# Patient Record
Sex: FEMALE | Race: BLACK OR AFRICAN AMERICAN | NOT HISPANIC OR LATINO | Employment: OTHER | URBAN - METROPOLITAN AREA
[De-identification: names, ages, dates, MRNs, and addresses within clinical notes are randomized per-mention and may not be internally consistent; named-entity substitution may affect disease eponyms.]

---

## 2023-11-28 ENCOUNTER — TELEPHONE (OUTPATIENT)
Age: 63
End: 2023-11-28

## 2023-11-28 NOTE — TELEPHONE ENCOUNTER
Called Wallace Escalona requested that patient call the office with updated insurance. If patient is self pay it is $165 time of visit - 50 % discount.

## 2023-11-30 ENCOUNTER — OFFICE VISIT (OUTPATIENT)
Age: 63
End: 2023-11-30
Payer: MEDICARE

## 2023-11-30 VITALS
SYSTOLIC BLOOD PRESSURE: 138 MMHG | BODY MASS INDEX: 36.65 KG/M2 | RESPIRATION RATE: 18 BRPM | DIASTOLIC BLOOD PRESSURE: 84 MMHG | WEIGHT: 220 LBS | HEIGHT: 65 IN

## 2023-11-30 DIAGNOSIS — Z11.59 ENCOUNTER FOR HEPATITIS C SCREENING TEST FOR LOW RISK PATIENT: Primary | ICD-10-CM

## 2023-11-30 DIAGNOSIS — K29.70 GASTRITIS WITHOUT BLEEDING, UNSPECIFIED CHRONICITY, UNSPECIFIED GASTRITIS TYPE: ICD-10-CM

## 2023-11-30 DIAGNOSIS — Z86.010 HISTORY OF COLON POLYPS: ICD-10-CM

## 2023-11-30 PROCEDURE — 99204 OFFICE O/P NEW MOD 45 MIN: CPT | Performed by: INTERNAL MEDICINE

## 2023-11-30 RX ORDER — GABAPENTIN 100 MG/1
CAPSULE ORAL
COMMUNITY
Start: 2023-11-24

## 2023-11-30 RX ORDER — CYCLOBENZAPRINE HCL 10 MG
10 TABLET ORAL
COMMUNITY
Start: 2023-09-15

## 2023-11-30 RX ORDER — ATORVASTATIN CALCIUM 80 MG/1
TABLET, FILM COATED ORAL
COMMUNITY
Start: 2023-02-28 | End: 2024-02-28

## 2023-11-30 RX ORDER — NAPROXEN 500 MG/1
500 TABLET ORAL
COMMUNITY
Start: 2023-04-10 | End: 2024-04-09

## 2023-11-30 RX ORDER — CLOTRIMAZOLE AND BETAMETHASONE DIPROPIONATE 10; .64 MG/G; MG/G
CREAM TOPICAL 2 TIMES DAILY
COMMUNITY
Start: 2023-07-18 | End: 2023-12-24

## 2023-11-30 RX ORDER — PANTOPRAZOLE SODIUM 40 MG/1
TABLET, DELAYED RELEASE ORAL
COMMUNITY
Start: 2023-02-28 | End: 2024-02-28

## 2023-11-30 RX ORDER — BETAMETHASONE DIPROPIONATE 0.5 MG/G
CREAM TOPICAL 2 TIMES DAILY
COMMUNITY
Start: 2023-09-15

## 2023-11-30 NOTE — PROGRESS NOTES
[facility] - Outpatient Note  Elkin Henriquez 70 y.o. female MRN: [id number]  Encounter: [id number]      ASSESSMENT AND PLAN:    Elkin Henriquez is a 70 y.o. old pleasant female with PMH of colon polyps, GERD, hyperlipidemia who presents for consultation gerd and history of colon polyps    History of colon polyps-last colonoscopy in 2018 showed 7 colon polyps up to 13 mm with repeat recommended in 3 years. I do not have the pathology at the time of this dictation. Repeat was recommended in 3 years. Plan for repeat colonoscopy    Hepatitis C screening-no previous antibody  Check hepatitis C    GERD, abnormal CAT scan-patient with long history of GERD on Protonix as needed. She says sometimes she has epigastric discomfort and had CAT scan done recently at outside hospital that shows diffuse thickening of the stomach which could be gastritis versus underdistention  For EGD to evaluate for Simms's/GERD and gastritis with biopsies for H. pylori. I advised patient to take Protonix every day for its full benefit on empty stomach  GERD lifestyle changes discussed, including avoidance of trigger foods (potential foods include coffee, caffeine, chocolate, mint, tomato-based products, spicy foods, fatty foods), avoid tight fitting clothing, elevated head of bed 30 degrees, avoid eating 2-3 hours prior to bedtime, weight loss, avoid alcohol, avoid tobacco use. Obesity-36.61. Patient refusing weight management referral  Counseled her on diet and nutrition and exercise      There are no diagnoses linked to this encounter.  ______________________________________________________________________    SUBJECTIVE: Patient reports she has intermittent heartburn and dyspepsia which she describes epigastric discomfort for the past several years. She takes Protonix as needed as she wants to minimize medications she takes so she does not take it every day. She has no nausea vomiting.   She denies any dysphagia weight loss odynophagia. She had colonoscopy in 2018 as above. She does struggle with her weight despite stating she does not eat very much. I reviewed prior external notes    I reviewed previous lab results and images      REVIEW OF SYSTEMS:     REVIEW OF ALL OTHER SYSTEMS IS OTHERWISE NEGATIVE. Historical Information   No past medical history on file. No past surgical history on file. Social History   Social History     Substance and Sexual Activity   Alcohol Use Not on file     Social History     Substance and Sexual Activity   Drug Use Not on file     Social History     Tobacco Use   Smoking Status Not on file   Smokeless Tobacco Not on file     No family history on file. Meds/Allergies       Current Outpatient Medications:     atorvastatin (LIPITOR) 80 mg tablet    betamethasone, augmented, (DIPROLENE-AF) 0.05 % cream    clotrimazole-betamethasone (LOTRISONE) 1-0.05 % cream    cyclobenzaprine (FLEXERIL) 10 mg tablet    gabapentin (NEURONTIN) 100 mg capsule    naproxen (NAPROSYN) 500 mg tablet    pantoprazole (PROTONIX) 40 mg tablet    Allergies   Allergen Reactions    Morphine Hives and Rash           Objective     Blood pressure 138/84, resp. rate 18, height 5' 5" (1.651 m), weight 99.8 kg (220 lb). Body mass index is 36.61 kg/m². PHYSICAL EXAM:      General Appearance:   Alert, cooperative, no distress   HEENT:   Normocephalic, atraumatic, anicteric. Neck:  Supple, symmetrical, trachea midline   Lungs:   Clear to auscultation bilaterally; no rales, rhonchi or wheezing; respirations unlabored    Heart[de-identified]   Regular rate and rhythm; no murmur, rub, or gallop.    Abdomen:   Soft, non-tender, non-distended; normal bowel sounds; no masses, no organomegaly    Genitalia:   Deferred    Rectal:   Deferred    Extremities:  No cyanosis, clubbing or edema    Pulses:  2+ and symmetric    Skin:  No jaundice, rashes, or lesions    Lymph nodes:  No palpable cervical lymphadenopathy Lab Results:   No visits with results within 1 Day(s) from this visit. Latest known visit with results is:   No results found for any previous visit. Radiology Results:   No results found.

## 2023-11-30 NOTE — H&P (VIEW-ONLY)
[facility] - Outpatient Note  Reina Cano 63 y.o. female MRN: [id number]  Encounter: [id number]      ASSESSMENT AND PLAN:    Reina Cano is a 63 y.o. old pleasant female with PMH of colon polyps, GERD, hyperlipidemia who presents for consultation gerd and history of colon polyps    History of colon polyps-last colonoscopy in 2018 showed 7 colon polyps up to 13 mm with repeat recommended in 3 years.  I do not have the pathology at the time of this dictation.  Repeat was recommended in 3 years.  Plan for repeat colonoscopy    Hepatitis C screening-no previous antibody  Check hepatitis C    GERD, abnormal CAT scan-patient with long history of GERD on Protonix as needed.  She says sometimes she has epigastric discomfort and had CAT scan done recently at outside hospital that shows diffuse thickening of the stomach which could be gastritis versus underdistention  For EGD to evaluate for Simms's/GERD and gastritis with biopsies for H. pylori.  I advised patient to take Protonix every day for its full benefit on empty stomach  GERD lifestyle changes discussed, including avoidance of trigger foods (potential foods include coffee, caffeine, chocolate, mint, tomato-based products, spicy foods, fatty foods), avoid tight fitting clothing, elevated head of bed 30 degrees, avoid eating 2-3 hours prior to bedtime, weight loss, avoid alcohol, avoid tobacco use.    Obesity-36.61.  Patient refusing weight management referral  Counseled her on diet and nutrition and exercise      There are no diagnoses linked to this encounter.  ______________________________________________________________________    SUBJECTIVE: Patient reports she has intermittent heartburn and dyspepsia which she describes epigastric discomfort for the past several years.  She takes Protonix as needed as she wants to minimize medications she takes so she does not take it every day.  She has no nausea vomiting.  She denies  "any dysphagia weight loss odynophagia.  She had colonoscopy in 2018 as above.  She does struggle with her weight despite stating she does not eat very much.      I reviewed prior external notes    I reviewed previous lab results and images      REVIEW OF SYSTEMS:     REVIEW OF ALL OTHER SYSTEMS IS OTHERWISE NEGATIVE.      Historical Information   No past medical history on file.  No past surgical history on file.  Social History   Social History     Substance and Sexual Activity   Alcohol Use Not on file     Social History     Substance and Sexual Activity   Drug Use Not on file     Social History     Tobacco Use   Smoking Status Not on file   Smokeless Tobacco Not on file     No family history on file.    Meds/Allergies       Current Outpatient Medications:     atorvastatin (LIPITOR) 80 mg tablet    betamethasone, augmented, (DIPROLENE-AF) 0.05 % cream    clotrimazole-betamethasone (LOTRISONE) 1-0.05 % cream    cyclobenzaprine (FLEXERIL) 10 mg tablet    gabapentin (NEURONTIN) 100 mg capsule    naproxen (NAPROSYN) 500 mg tablet    pantoprazole (PROTONIX) 40 mg tablet    Allergies   Allergen Reactions    Morphine Hives and Rash           Objective     Blood pressure 138/84, resp. rate 18, height 5' 5\" (1.651 m), weight 99.8 kg (220 lb). Body mass index is 36.61 kg/m².      PHYSICAL EXAM:      General Appearance:   Alert, cooperative, no distress   HEENT:   Normocephalic, atraumatic, anicteric.     Neck:  Supple, symmetrical, trachea midline   Lungs:   Clear to auscultation bilaterally; no rales, rhonchi or wheezing; respirations unlabored    Heart::   Regular rate and rhythm; no murmur, rub, or gallop.   Abdomen:   Soft, non-tender, non-distended; normal bowel sounds; no masses, no organomegaly    Genitalia:   Deferred    Rectal:   Deferred    Extremities:  No cyanosis, clubbing or edema    Pulses:  2+ and symmetric    Skin:  No jaundice, rashes, or lesions    Lymph nodes:  No palpable cervical lymphadenopathy    "     Lab Results:   No visits with results within 1 Day(s) from this visit.   Latest known visit with results is:   No results found for any previous visit.         Radiology Results:   No results found.

## 2023-11-30 NOTE — PATIENT INSTRUCTIONS
Scheduled date of EGD/colonoscopy (as of today): 12/19/23  Physician performing EGD/colonoscopy: Wood[facility]  Location of EGD/colonoscopy: [facility]  Desired bowel prep reviewed with patient: Miralax  Instructions reviewed with patient by: Ilya DUARTE  Clearances:

## 2023-12-19 ENCOUNTER — ANESTHESIA (OUTPATIENT)
Dept: GASTROENTEROLOGY | Facility: HOSPITAL | Age: 63
End: 2023-12-19

## 2023-12-19 ENCOUNTER — ANESTHESIA EVENT (OUTPATIENT)
Dept: GASTROENTEROLOGY | Facility: HOSPITAL | Age: 63
End: 2023-12-19

## 2023-12-19 ENCOUNTER — HOSPITAL ENCOUNTER (OUTPATIENT)
Dept: GASTROENTEROLOGY | Facility: HOSPITAL | Age: 63
Setting detail: OUTPATIENT SURGERY
Discharge: HOME | End: 2023-12-19
Attending: INTERNAL MEDICINE
Payer: MEDICARE

## 2023-12-19 VITALS
HEIGHT: 65 IN | RESPIRATION RATE: 18 BRPM | HEART RATE: 65 BPM | BODY MASS INDEX: 36.07 KG/M2 | SYSTOLIC BLOOD PRESSURE: 122 MMHG | OXYGEN SATURATION: 98 % | DIASTOLIC BLOOD PRESSURE: 88 MMHG | WEIGHT: 216.49 LBS | TEMPERATURE: 97.9 F

## 2023-12-19 DIAGNOSIS — K29.70 GASTRITIS WITHOUT BLEEDING, UNSPECIFIED CHRONICITY, UNSPECIFIED GASTRITIS TYPE: ICD-10-CM

## 2023-12-19 DIAGNOSIS — Z86.010 HISTORY OF COLON POLYPS: ICD-10-CM

## 2023-12-19 PROCEDURE — 88341 IMHCHEM/IMCYTCHM EA ADD ANTB: CPT | Performed by: PATHOLOGY

## 2023-12-19 PROCEDURE — 88342 IMHCHEM/IMCYTCHM 1ST ANTB: CPT | Performed by: PATHOLOGY

## 2023-12-19 PROCEDURE — 45385 COLONOSCOPY W/LESION REMOVAL: CPT | Performed by: INTERNAL MEDICINE

## 2023-12-19 PROCEDURE — 43239 EGD BIOPSY SINGLE/MULTIPLE: CPT | Performed by: INTERNAL MEDICINE

## 2023-12-19 PROCEDURE — 88305 TISSUE EXAM BY PATHOLOGIST: CPT | Performed by: PATHOLOGY

## 2023-12-19 RX ORDER — PROPOFOL 10 MG/ML
INJECTION, EMULSION INTRAVENOUS AS NEEDED
Status: DISCONTINUED | OUTPATIENT
Start: 2023-12-19 | End: 2023-12-19

## 2023-12-19 RX ORDER — SODIUM CHLORIDE, SODIUM LACTATE, POTASSIUM CHLORIDE, CALCIUM CHLORIDE 600; 310; 30; 20 MG/100ML; MG/100ML; MG/100ML; MG/100ML
INJECTION, SOLUTION INTRAVENOUS CONTINUOUS PRN
Status: DISCONTINUED | OUTPATIENT
Start: 2023-12-19 | End: 2023-12-19

## 2023-12-19 RX ORDER — GLYCOPYRROLATE 0.2 MG/ML
INJECTION INTRAMUSCULAR; INTRAVENOUS AS NEEDED
Status: DISCONTINUED | OUTPATIENT
Start: 2023-12-19 | End: 2023-12-19

## 2023-12-19 RX ORDER — LIDOCAINE HYDROCHLORIDE 20 MG/ML
INJECTION, SOLUTION EPIDURAL; INFILTRATION; INTRACAUDAL; PERINEURAL AS NEEDED
Status: DISCONTINUED | OUTPATIENT
Start: 2023-12-19 | End: 2023-12-19

## 2023-12-19 RX ADMIN — PROPOFOL 20 MG: 10 INJECTION, EMULSION INTRAVENOUS at 08:57

## 2023-12-19 RX ADMIN — SODIUM CHLORIDE, SODIUM LACTATE, POTASSIUM CHLORIDE, AND CALCIUM CHLORIDE: .6; .31; .03; .02 INJECTION, SOLUTION INTRAVENOUS at 08:16

## 2023-12-19 RX ADMIN — PROPOFOL 60 MG: 10 INJECTION, EMULSION INTRAVENOUS at 08:52

## 2023-12-19 RX ADMIN — PROPOFOL 50 MG: 10 INJECTION, EMULSION INTRAVENOUS at 08:49

## 2023-12-19 RX ADMIN — LIDOCAINE HYDROCHLORIDE 100 MG: 20 INJECTION, SOLUTION EPIDURAL; INFILTRATION; INTRACAUDAL at 08:45

## 2023-12-19 RX ADMIN — PROPOFOL 20 MG: 10 INJECTION, EMULSION INTRAVENOUS at 08:55

## 2023-12-19 RX ADMIN — PROPOFOL 150 MG: 10 INJECTION, EMULSION INTRAVENOUS at 08:45

## 2023-12-19 RX ADMIN — GLYCOPYRROLATE 0.1 MCG: 0.2 INJECTION, SOLUTION INTRAMUSCULAR; INTRAVENOUS at 08:39

## 2023-12-19 NOTE — DISCHARGE INSTRUCTIONS
Upper Endoscopy and Colonoscopy   WHAT YOU NEED TO KNOW:   An upper endoscopy is also called an upper gastrointestinal (GI) endoscopy, or an esophagogastroduodenoscopy (EGD). It is a procedure to examine the inside of your esophagus, stomach, and duodenum (first part of the small intestine) with a scope. You may feel bloated, gassy, or have some abdominal discomfort after your procedure. Your throat may be sore for 24 to 36 hours. You may burp or pass gas from air that is still inside your body.                A colonoscopy is a procedure to examine the inside of your colon (intestine) with a scope. Polyps or tissue growths may have been removed during your colonoscopy. It is normal to feel bloated and to have some abdominal discomfort. You should be passing gas. If you have hemorrhoids or you had polyps removed, you may have a small amount of bleeding.          DISCHARGE INSTRUCTIONS:   Seek care immediately if:   You have sudden, severe abdominal pain.     You have problems swallowing.     You have a large amount of black, sticky bowel movements or blood in your bowel movements.     You have sudden trouble breathing.     You feel weak, lightheaded, or faint or your heart beats faster than normal for you.     Contact your healthcare provider if:   You have a fever and chills.      You have nausea or are vomiting.      Your abdomen is bloated or feels full and hard.     You have abdominal pain.    You have a large amount of black, sticky bowel movements or blood in your bowel movements.    You have not had a bowel movement for 3 days after your procedure.    You have rash or hives.    You have questions or concerns about your procedure.     Activity:   ·       Do not lift, strain, or run for 24 hours after your procedure.     ·       Rest after your procedure. You have been given medicine to relax you. Do not drive or make important decisions until the day after your procedure. Return to your normal activity as  directed.     ·       Relieve gas and discomfort from bloating by lying on your right side with a heating pad on your abdomen. You may need to take short walks to help the gas move out. Eat small meals until bloating is relieved.  Follow up with your healthcare provider as directed: Write down your questions so you remember to ask them during your visits.      If you take a “blood thinner”, please review the specific instructions from your endoscopist about when you should resume it. These can be found in the “Recommendation” and “Your Medication list” sections of this After Visit Summary.

## 2023-12-19 NOTE — ANESTHESIA POSTPROCEDURE EVALUATION
Post-Op Assessment Note    CV Status:  Stable  Pain Score: 0    Pain management: adequate       Mental Status:  Alert and awake   Hydration Status:  Euvolemic   PONV Controlled:  Controlled   Airway Patency:  Patent     Post Op Vitals Reviewed: Yes      Staff: CRNA               /70 (12/19/23 0904)    Temp 97.9 °F (36.6 °C) (12/19/23 0904)    Pulse 76 (12/19/23 0904)   Resp 18 (12/19/23 0904)    SpO2 97 % (12/19/23 0904)

## 2023-12-19 NOTE — ANESTHESIA PREPROCEDURE EVALUATION
Procedure:  COLONOSCOPY  EGD    Relevant Problems   No relevant active problems   Obesity     NPO appropriate    Physical Exam    Airway    Mallampati score: III  TM Distance: >3 FB  Neck ROM: full     Dental        Cardiovascular  Rhythm: regular    Pulmonary   Breath sounds clear to auscultation    Other Findings  post-pubertal.      Anesthesia Plan  ASA Score- 2     Anesthesia Type- IV sedation with anesthesia with ASA Monitors.         Additional Monitors:     Airway Plan:            Plan Factors-    Chart reviewed.    Patient summary reviewed.    Patient is not a current smoker.              Induction- intravenous.    Postoperative Plan-     Informed Consent- Anesthetic plan and risks discussed with patient.  I personally reviewed this patient with the CRNA. Discussed and agreed on the Anesthesia Plan with the CRNA..

## 2023-12-19 NOTE — INTERVAL H&P NOTE
H&P reviewed. After examining the patient I find no changes in the patients condition since the H&P had been written.    There were no vitals filed for this visit.

## 2023-12-26 ENCOUNTER — TELEPHONE (OUTPATIENT)
Age: 63
End: 2023-12-26

## 2023-12-26 DIAGNOSIS — A04.8 H. PYLORI INFECTION: Primary | ICD-10-CM

## 2023-12-26 PROCEDURE — 88305 TISSUE EXAM BY PATHOLOGIST: CPT | Performed by: PATHOLOGY

## 2023-12-26 PROCEDURE — 88341 IMHCHEM/IMCYTCHM EA ADD ANTB: CPT | Performed by: PATHOLOGY

## 2023-12-26 PROCEDURE — 88342 IMHCHEM/IMCYTCHM 1ST ANTB: CPT | Performed by: PATHOLOGY

## 2023-12-26 RX ORDER — TETRACYCLINE HYDROCHLORIDE 500 MG/1
500 CAPSULE ORAL 4 TIMES DAILY
Qty: 56 CAPSULE | Refills: 0 | Status: SHIPPED | OUTPATIENT
Start: 2023-12-26 | End: 2024-01-09

## 2023-12-26 RX ORDER — OMEPRAZOLE 40 MG/1
40 CAPSULE, DELAYED RELEASE ORAL 2 TIMES DAILY
Qty: 28 CAPSULE | Refills: 0 | Status: SHIPPED | OUTPATIENT
Start: 2023-12-26 | End: 2024-01-09

## 2023-12-26 RX ORDER — BISMUTH SUBSALICYLATE 262 MG/1
524 TABLET, CHEWABLE ORAL
Qty: 30 TABLET | Refills: 0 | Status: SHIPPED | OUTPATIENT
Start: 2023-12-26

## 2023-12-26 RX ORDER — METRONIDAZOLE 500 MG/1
500 TABLET ORAL 4 TIMES DAILY
Qty: 56 TABLET | Refills: 0 | Status: SHIPPED | OUTPATIENT
Start: 2023-12-26 | End: 2024-01-09

## 2023-12-26 NOTE — TELEPHONE ENCOUNTER
Caller: Patient transferred to [facility]     Doctor: n/a    Reason for call: n/a    Call back#: n/a

## 2023-12-26 NOTE — TELEPHONE ENCOUNTER
----- Message from Anthony Troy MD sent at 12/26/2023 10:23 AM EST -----  Hi can we call patient H. pylori and treat accordingly.  She will also need repeat colonoscopy in 3 to 6 months for history of colon polyps given poor preparation.  Will need to do a bowel prep.  Thank you.

## 2023-12-26 NOTE — TELEPHONE ENCOUNTER
Tried pt no answer and can't leave message, I will try again later this afternoon sent medication to treat H pylori to pharmacy.

## 2024-02-01 ENCOUNTER — TELEPHONE (OUTPATIENT)
Age: 64
End: 2024-02-01

## 2024-02-01 NOTE — TELEPHONE ENCOUNTER
02/01/24  Screened by: Soraya Mandujano    Referring Provider     Pre- Screening:     There is no height or weight on file to calculate BMI.  Has patient been referred for a routine screening Colonoscopy? yes  Is the patient between 45-75 years old? yes      Previous Colonoscopy yes   If yes:    Date: 12/19/2023    Facility:     Reason:         Does the patient want to see a Gastroenterologist prior to their procedure OR are they having any GI symptoms? no    Has the patient been hospitalized or had abdominal surgery in the past 6 months? no    Does the patient use supplemental oxygen? no    Does the patient take Coumadin, Lovenox, Plavix, Elliquis, Xarelto, or other blood thinning medication? no    Has the patient had a stroke, cardiac event, or stent placed in the past year? no      If patient is between 45yrs - 49yrs, please advise patient that we will have to confirm benefits & coverage with their insurance company for a routine screening colonoscopy.

## 2024-02-01 NOTE — TELEPHONE ENCOUNTER
Scheduled date of colonoscopy (as of today):06/21/2024  Physician performing colonoscopy:Dr Troy  Location of colonoscopy:[facility]  Bowel prep reviewed with patient: miralax/dul  Instructions reviewed with patient by:sent via email  Clearances: n/a

## 2024-06-21 ENCOUNTER — ANESTHESIA (OUTPATIENT)
Dept: GASTROENTEROLOGY | Facility: HOSPITAL | Age: 64
End: 2024-06-21

## 2024-06-21 ENCOUNTER — ANESTHESIA EVENT (OUTPATIENT)
Dept: GASTROENTEROLOGY | Facility: HOSPITAL | Age: 64
End: 2024-06-21

## 2024-06-21 ENCOUNTER — HOSPITAL ENCOUNTER (OUTPATIENT)
Dept: GASTROENTEROLOGY | Facility: HOSPITAL | Age: 64
Setting detail: OUTPATIENT SURGERY
End: 2024-06-21
Attending: INTERNAL MEDICINE
Payer: MEDICARE

## 2024-06-21 VITALS
WEIGHT: 216.93 LBS | RESPIRATION RATE: 20 BRPM | HEIGHT: 65 IN | OXYGEN SATURATION: 100 % | TEMPERATURE: 97.5 F | BODY MASS INDEX: 36.14 KG/M2 | DIASTOLIC BLOOD PRESSURE: 77 MMHG | SYSTOLIC BLOOD PRESSURE: 132 MMHG | HEART RATE: 73 BPM

## 2024-06-21 DIAGNOSIS — Z86.010 HISTORY OF COLON POLYPS: ICD-10-CM

## 2024-06-21 PROBLEM — E78.5 HYPERLIPIDEMIA: Status: ACTIVE | Noted: 2024-06-21

## 2024-06-21 PROBLEM — K21.9 GASTROESOPHAGEAL REFLUX DISEASE: Status: ACTIVE | Noted: 2024-06-21

## 2024-06-21 PROCEDURE — 88305 TISSUE EXAM BY PATHOLOGIST: CPT | Performed by: PATHOLOGY

## 2024-06-21 PROCEDURE — 45385 COLONOSCOPY W/LESION REMOVAL: CPT | Performed by: INTERNAL MEDICINE

## 2024-06-21 RX ORDER — PROPOFOL 10 MG/ML
INJECTION, EMULSION INTRAVENOUS AS NEEDED
Status: DISCONTINUED | OUTPATIENT
Start: 2024-06-21 | End: 2024-06-21

## 2024-06-21 RX ORDER — SODIUM CHLORIDE, SODIUM LACTATE, POTASSIUM CHLORIDE, CALCIUM CHLORIDE 600; 310; 30; 20 MG/100ML; MG/100ML; MG/100ML; MG/100ML
125 INJECTION, SOLUTION INTRAVENOUS CONTINUOUS
Status: DISCONTINUED | OUTPATIENT
Start: 2024-06-21 | End: 2024-06-25 | Stop reason: HOSPADM

## 2024-06-21 RX ORDER — SODIUM CHLORIDE, SODIUM LACTATE, POTASSIUM CHLORIDE, CALCIUM CHLORIDE 600; 310; 30; 20 MG/100ML; MG/100ML; MG/100ML; MG/100ML
INJECTION, SOLUTION INTRAVENOUS CONTINUOUS PRN
Status: DISCONTINUED | OUTPATIENT
Start: 2024-06-21 | End: 2024-06-21

## 2024-06-21 RX ORDER — IBUPROFEN 400 MG/1
TABLET ORAL EVERY 6 HOURS PRN
COMMUNITY

## 2024-06-21 RX ORDER — LIDOCAINE HYDROCHLORIDE 20 MG/ML
INJECTION, SOLUTION EPIDURAL; INFILTRATION; INTRACAUDAL; PERINEURAL AS NEEDED
Status: DISCONTINUED | OUTPATIENT
Start: 2024-06-21 | End: 2024-06-21

## 2024-06-21 RX ADMIN — SODIUM CHLORIDE, SODIUM LACTATE, POTASSIUM CHLORIDE, AND CALCIUM CHLORIDE: .6; .31; .03; .02 INJECTION, SOLUTION INTRAVENOUS at 10:03

## 2024-06-21 RX ADMIN — PROPOFOL 120 MG: 10 INJECTION, EMULSION INTRAVENOUS at 10:39

## 2024-06-21 RX ADMIN — PROPOFOL 30 MG: 10 INJECTION, EMULSION INTRAVENOUS at 10:42

## 2024-06-21 RX ADMIN — SODIUM CHLORIDE, SODIUM LACTATE, POTASSIUM CHLORIDE, AND CALCIUM CHLORIDE 125 ML/HR: .6; .31; .03; .02 INJECTION, SOLUTION INTRAVENOUS at 09:19

## 2024-06-21 RX ADMIN — LIDOCAINE HYDROCHLORIDE 100 MG: 20 INJECTION, SOLUTION EPIDURAL; INFILTRATION; INTRACAUDAL; PERINEURAL at 10:39

## 2024-06-21 RX ADMIN — PROPOFOL 50 MG: 10 INJECTION, EMULSION INTRAVENOUS at 10:45

## 2024-06-21 RX ADMIN — PROPOFOL 50 MG: 10 INJECTION, EMULSION INTRAVENOUS at 10:48

## 2024-06-21 NOTE — ANESTHESIA POSTPROCEDURE EVALUATION
Post-Op Assessment Note    CV Status:  Stable    Pain management: adequate       Mental Status:  Alert and awake   Hydration Status:  Euvolemic   PONV Controlled:  Controlled   Airway Patency:  Patent     Post Op Vitals Reviewed: Yes    No anethesia notable event occurred.    Staff: CRNA               /71 (06/21/24 1058)    Temp 97.5 °F (36.4 °C) (06/21/24 1058)    Pulse 74 (06/21/24 1058)   Resp 20 (06/21/24 1058)    SpO2 96 % (06/21/24 1058)

## 2024-06-21 NOTE — H&P
"History and Physical - [facility]  Reina Cano 64 y.o. female MRN: [id number]                  HPI: Reina Cano is a 64 y.o. year old female who presents for colonoscopy for colon cancer screening      REVIEW OF SYSTEMS: Per the HPI, and otherwise unremarkable.    Historical Information   Past Medical History:   Diagnosis Date    Hyperlipidemia      Past Surgical History:   Procedure Laterality Date     SECTION      COLONOSCOPY       Social History   Social History     Substance and Sexual Activity   Alcohol Use Not Currently    Comment: hasnt had a drink for years     Social History     Substance and Sexual Activity   Drug Use Never     Social History     Tobacco Use   Smoking Status Every Day    Current packs/day: 0.25    Types: Cigarettes   Smokeless Tobacco Never     History reviewed. No pertinent family history.    Meds/Allergies       Current Outpatient Medications:     atorvastatin (LIPITOR) 80 mg tablet    betamethasone, augmented, (DIPROLENE-AF) 0.05 % cream    clotrimazole-betamethasone (LOTRISONE) 1-0.05 % cream    ibuprofen (MOTRIN) 400 mg tablet    omeprazole (PriLOSEC) 40 MG capsule    bismuth subsalicylate (PEPTO BISMOL) 262 MG chewable tablet    cyclobenzaprine (FLEXERIL) 10 mg tablet    gabapentin (NEURONTIN) 100 mg capsule    naproxen (NAPROSYN) 500 mg tablet    pantoprazole (PROTONIX) 40 mg tablet    Current Facility-Administered Medications:     lactated ringers infusion, 125 mL/hr, Intravenous, Continuous, 125 mL/hr at 24 0919    Allergies   Allergen Reactions    Morphine Hives and Rash       Objective     /79   Pulse 71   Temp 97.5 °F (36.4 °C) (Temporal)   Resp 18   Ht 5' 5\" (1.651 m)   Wt 98.4 kg (216 lb 14.9 oz)   SpO2 98%   BMI 36.10 kg/m²       PHYSICAL EXAM    Gen: NAD  Head: NCAT  CV: RRR  CHEST: Clear  ABD: soft, NT/ND  EXT: no edema      ASSESSMENT/PLAN:  Reina Cano is a 64 y.o. year old female who presents for " colonoscopy for colon cancer screening The patient is stable and optimized for the procedure, we reviewed risk and benefits. Risk include but not limited to infection, bleeding, perforation and missing a lesion.

## 2024-06-21 NOTE — ANESTHESIA PREPROCEDURE EVALUATION
Procedure:  COLONOSCOPY    Relevant Problems   ANESTHESIA (within normal limits)      CARDIO   (+) Hyperlipidemia   (-) MI (myocardial infarction) (HCC)      GI/HEPATIC  NPO confirmed  BMI 36.1   (+) Gastroesophageal reflux disease      /RENAL (within normal limits)      HEMATOLOGY (within normal limits)      NEURO/PSYCH   (-) CVA (cerebral vascular accident) (HCC)      PULMONARY (within normal limits)   (-) URI (upper respiratory infection)     Allergies   Allergen Reactions    Morphine Hives and Rash     Social History     Tobacco Use    Smoking status: Every Day     Current packs/day: 0.25     Types: Cigarettes    Smokeless tobacco: Never   Vaping Use    Vaping status: Never Used   Substance Use Topics    Alcohol use: Not Currently     Comment: hasnt had a drink for years    Drug use: Never     Current Outpatient Medications   Medication Instructions    atorvastatin (LIPITOR) 80 mg tablet TAKE 1 TABLET BY MOUTIN THE MORNING    betamethasone, augmented, (DIPROLENE-AF) 0.05 % cream Apply externally, 2 times daily    bismuth subsalicylate (PEPTO BISMOL) 524 mg, Oral, 4 times daily (before meals and at bedtime)    clotrimazole-betamethasone (LOTRISONE) 1-0.05 % cream Apply externally, 2 times daily    cyclobenzaprine (FLEXERIL) 10 mg    gabapentin (NEURONTIN) 100 mg capsule     ibuprofen (MOTRIN) 400 mg tablet Oral, Every 6 hours PRN    naproxen (NAPROSYN) 500 mg    omeprazole (PRILOSEC) 40 mg, Oral, 2 times daily, 1/2 hr. Ac meals in AM and PM    pantoprazole (PROTONIX) 40 mg tablet TAKE 1 TABLET BY MOUTH DAILY FOR STOMACH PAIN     Lab Results   Component Value Date    SODIUM 140 03/15/2024    K 4.4 03/15/2024     03/15/2024    CO2 25 03/15/2024    BUN 13 03/15/2024    CREATININE 0.7 03/15/2024    GLUC 101 03/15/2024    HGBA1C 5.8 (H) 03/15/2024    AST 17 03/10/2023    ALT 22 03/10/2023    ALKPHOS 80 03/10/2023    TBILI 0.3 03/10/2023    ALB 4.2 03/10/2023     Vitals:    06/21/24 0912   BP: 120/79   Pulse:  71   Resp: 18   Temp: 97.5 °F (36.4 °C)   SpO2: 98%     Physical Exam    Airway    Mallampati score: III  TM Distance: >3 FB  Neck ROM: full     Dental   Comment: Poor dentition, denies loose teeth     Cardiovascular  Rhythm: regular    Pulmonary   Breath sounds clear to auscultation    Other Findings  post-pubertal.      Anesthesia Plan  ASA Score- 2     Anesthesia Type- IV sedation with anesthesia with ASA Monitors.         Additional Monitors:     Airway Plan:     Comment: O2 mask, natural airway, EtCO2 monitor. Risks discussed including awareness, aspiration, drug reactions and conversion to GA..       Plan Factors-    Chart reviewed.   Existing labs reviewed. Patient summary reviewed.    Patient is not a current smoker.              Induction- intravenous.    Postoperative Plan-         Informed Consent- Anesthetic plan and risks discussed with patient.  I personally reviewed this patient with the CRNA. Discussed and agreed on the Anesthesia Plan with the CRNA..

## 2024-06-25 PROCEDURE — 88305 TISSUE EXAM BY PATHOLOGIST: CPT | Performed by: PATHOLOGY

## 2024-06-26 ENCOUNTER — TELEPHONE (OUTPATIENT)
Age: 64
End: 2024-06-26

## 2024-06-26 NOTE — TELEPHONE ENCOUNTER
----- Message from Anthony Troy MD sent at 6/25/2024  4:40 PM EDT -----  Hi can we let pt know about one precancerous polyp. Repeat colon in 5 years for polyp.

## 2024-07-22 ENCOUNTER — HOSPITAL ENCOUNTER (EMERGENCY)
Facility: HOSPITAL | Age: 64
Discharge: HOME | End: 2024-07-22
Attending: EMERGENCY MEDICINE
Payer: MEDICARE

## 2024-07-22 ENCOUNTER — APPOINTMENT (EMERGENCY)
Dept: CT IMAGING | Facility: HOSPITAL | Age: 64
End: 2024-07-22
Payer: MEDICARE

## 2024-07-22 VITALS
RESPIRATION RATE: 20 BRPM | DIASTOLIC BLOOD PRESSURE: 104 MMHG | TEMPERATURE: 97.8 F | SYSTOLIC BLOOD PRESSURE: 152 MMHG | OXYGEN SATURATION: 99 % | HEART RATE: 68 BPM

## 2024-07-22 DIAGNOSIS — R10.9 LEFT FLANK PAIN: Primary | ICD-10-CM

## 2024-07-22 DIAGNOSIS — T18.3XXA FOREIGN BODY IN SMALL INTESTINE, INITIAL ENCOUNTER: ICD-10-CM

## 2024-07-22 LAB
ALBUMIN SERPL BCG-MCNC: 3.8 G/DL (ref 3.5–5)
ALP SERPL-CCNC: 71 U/L (ref 34–104)
ALT SERPL W P-5'-P-CCNC: 16 U/L (ref 7–52)
ANION GAP SERPL CALCULATED.3IONS-SCNC: 3 MMOL/L (ref 4–13)
AST SERPL W P-5'-P-CCNC: 15 U/L (ref 13–39)
BASOPHILS # BLD MANUAL: 0.1 THOUSAND/UL (ref 0–0.1)
BASOPHILS NFR MAR MANUAL: 1 % (ref 0–1)
BILIRUB SERPL-MCNC: 0.41 MG/DL (ref 0.2–1)
BILIRUB UR QL STRIP: NEGATIVE
BUN SERPL-MCNC: 7 MG/DL (ref 5–25)
CALCIUM SERPL-MCNC: 9.1 MG/DL (ref 8.4–10.2)
CHLORIDE SERPL-SCNC: 107 MMOL/L (ref 96–108)
CLARITY UR: CLEAR
CO2 SERPL-SCNC: 30 MMOL/L (ref 21–32)
COLOR UR: NORMAL
CREAT SERPL-MCNC: 0.59 MG/DL (ref 0.6–1.3)
EOSINOPHIL # BLD MANUAL: 0.1 THOUSAND/UL (ref 0–0.4)
EOSINOPHIL NFR BLD MANUAL: 1 % (ref 0–6)
ERYTHROCYTE [DISTWIDTH] IN BLOOD BY AUTOMATED COUNT: 14 % (ref 11.6–15.1)
GFR SERPL CREATININE-BSD FRML MDRD: 95 ML/MIN/1.73SQ M
GIANT PLATELETS BLD QL SMEAR: PRESENT
GLUCOSE SERPL-MCNC: 93 MG/DL (ref 65–140)
GLUCOSE UR STRIP-MCNC: NEGATIVE MG/DL
HCT VFR BLD AUTO: 44 % (ref 34.8–46.1)
HGB BLD-MCNC: 14 G/DL (ref 11.5–15.4)
HGB UR QL STRIP.AUTO: NEGATIVE
KETONES UR STRIP-MCNC: NEGATIVE MG/DL
LEUKOCYTE ESTERASE UR QL STRIP: NEGATIVE
LG PLATELETS BLD QL SMEAR: PRESENT
LIPASE SERPL-CCNC: <6 U/L (ref 11–82)
LYMPHOCYTES # BLD AUTO: 1.96 THOUSAND/UL (ref 0.6–4.47)
LYMPHOCYTES # BLD AUTO: 20 % (ref 14–44)
MCH RBC QN AUTO: 27.6 PG (ref 26.8–34.3)
MCHC RBC AUTO-ENTMCNC: 31.8 G/DL (ref 31.4–37.4)
MCV RBC AUTO: 87 FL (ref 82–98)
MONOCYTES # BLD AUTO: 0.39 THOUSAND/UL (ref 0–1.22)
MONOCYTES NFR BLD: 4 % (ref 4–12)
NEUTROPHILS # BLD MANUAL: 7.24 THOUSAND/UL (ref 1.85–7.62)
NEUTS SEG NFR BLD AUTO: 74 % (ref 43–75)
NITRITE UR QL STRIP: NEGATIVE
PH UR STRIP.AUTO: 6.5 [PH]
PLATELET # BLD AUTO: 266 THOUSANDS/UL (ref 149–390)
PLATELET BLD QL SMEAR: ADEQUATE
PMV BLD AUTO: 10.9 FL (ref 8.9–12.7)
POTASSIUM SERPL-SCNC: 4.5 MMOL/L (ref 3.5–5.3)
PROT SERPL-MCNC: 6.7 G/DL (ref 6.4–8.4)
PROT UR STRIP-MCNC: NEGATIVE MG/DL
RBC # BLD AUTO: 5.08 MILLION/UL (ref 3.81–5.12)
SODIUM SERPL-SCNC: 140 MMOL/L (ref 135–147)
SP GR UR STRIP.AUTO: 1.02 (ref 1–1.03)
UROBILINOGEN UR STRIP-ACNC: <2 MG/DL
WBC # BLD AUTO: 9.79 THOUSAND/UL (ref 4.31–10.16)

## 2024-07-22 PROCEDURE — 99284 EMERGENCY DEPT VISIT MOD MDM: CPT

## 2024-07-22 PROCEDURE — 36415 COLL VENOUS BLD VENIPUNCTURE: CPT | Performed by: EMERGENCY MEDICINE

## 2024-07-22 PROCEDURE — 74176 CT ABD & PELVIS W/O CONTRAST: CPT

## 2024-07-22 PROCEDURE — 85027 COMPLETE CBC AUTOMATED: CPT | Performed by: EMERGENCY MEDICINE

## 2024-07-22 PROCEDURE — 96361 HYDRATE IV INFUSION ADD-ON: CPT

## 2024-07-22 PROCEDURE — 96374 THER/PROPH/DIAG INJ IV PUSH: CPT

## 2024-07-22 PROCEDURE — 99284 EMERGENCY DEPT VISIT MOD MDM: CPT | Performed by: EMERGENCY MEDICINE

## 2024-07-22 PROCEDURE — 80053 COMPREHEN METABOLIC PANEL: CPT | Performed by: EMERGENCY MEDICINE

## 2024-07-22 PROCEDURE — 83690 ASSAY OF LIPASE: CPT | Performed by: EMERGENCY MEDICINE

## 2024-07-22 PROCEDURE — 81003 URINALYSIS AUTO W/O SCOPE: CPT | Performed by: EMERGENCY MEDICINE

## 2024-07-22 PROCEDURE — 85007 BL SMEAR W/DIFF WBC COUNT: CPT | Performed by: EMERGENCY MEDICINE

## 2024-07-22 RX ORDER — ACETAMINOPHEN 325 MG/1
975 TABLET ORAL ONCE
Status: COMPLETED | OUTPATIENT
Start: 2024-07-22 | End: 2024-07-22

## 2024-07-22 RX ORDER — KETOROLAC TROMETHAMINE 30 MG/ML
15 INJECTION, SOLUTION INTRAMUSCULAR; INTRAVENOUS ONCE
Status: COMPLETED | OUTPATIENT
Start: 2024-07-22 | End: 2024-07-22

## 2024-07-22 RX ADMIN — KETOROLAC TROMETHAMINE 15 MG: 30 INJECTION, SOLUTION INTRAMUSCULAR; INTRAVENOUS at 12:41

## 2024-07-22 RX ADMIN — SODIUM CHLORIDE 1000 ML: 0.9 INJECTION, SOLUTION INTRAVENOUS at 12:42

## 2024-07-22 RX ADMIN — ACETAMINOPHEN 975 MG: 325 TABLET, FILM COATED ORAL at 12:26

## 2024-07-22 NOTE — ED PROVIDER NOTES
History  Chief Complaint   Patient presents with    Flank Pain     Left sided flank pain for the past 3 weeks starting after they received a colonoscopy. Denies issues with bowel movements, but reports urinary frequency, denies pain with urination.      64-year-old female no significant reported past history presenting with left flank pain.  Reports insidious onset of pain shortly after colonoscopy a few weeks ago.  Reports pain is intermittent and states is occasionally sharp.  Denies any radiation.  Denies any urinary changes such as pain, frequency, bleeding.  Denies any nausea vomiting diarrhea.  Denies history of similar.  Denies chest pain shortness of breath.  Denies any other complaints.  Chart reviewed.    Past Medical History:  No date: Hyperlipidemia  Family History: non-contributory  Social History          Prior to Admission Medications   Prescriptions Last Dose Informant Patient Reported? Taking?   atorvastatin (LIPITOR) 80 mg tablet  Self Yes No   Sig: TAKE 1 TABLET BY MOUTIN THE MORNING   betamethasone, augmented, (DIPROLENE-AF) 0.05 % cream  Self Yes No   Sig: Apply topically 2 (two) times a day   bismuth subsalicylate (PEPTO BISMOL) 262 MG chewable tablet   No No   Sig: Chew 2 tablets (524 mg total) 4 (four) times a day (before meals and at bedtime)   Patient not taking: Reported on 6/10/2024   clotrimazole-betamethasone (LOTRISONE) 1-0.05 % cream  Self Yes No   Sig: Apply topically 2 (two) times a day   cyclobenzaprine (FLEXERIL) 10 mg tablet  Self Yes No   Sig: Take 10 mg by mouth   Patient not taking: Reported on 6/10/2024   gabapentin (NEURONTIN) 100 mg capsule  Self Yes No   Patient not taking: Reported on 6/10/2024   ibuprofen (MOTRIN) 400 mg tablet   Yes No   Sig: Take by mouth every 6 (six) hours as needed for mild pain   naproxen (NAPROSYN) 500 mg tablet  Self Yes No   Sig: Take 500 mg by mouth   Patient not taking: Reported on 6/10/2024   omeprazole (PriLOSEC) 40 MG capsule   No No   Sig:  Take 1 capsule (40 mg total) by mouth 2 (two) times a day for 14 days 1/2 hr. Ac meals in AM and PM   pantoprazole (PROTONIX) 40 mg tablet  Self Yes No   Sig: TAKE 1 TABLET BY MOUTH DAILY FOR STOMACH PAIN   Patient not taking: Reported on 6/10/2024      Facility-Administered Medications: None       Past Medical History:   Diagnosis Date    Hyperlipidemia        Past Surgical History:   Procedure Laterality Date     SECTION      COLONOSCOPY         History reviewed. No pertinent family history.  I have reviewed and agree with the history as documented.    E-Cigarette/Vaping    E-Cigarette Use Never User      E-Cigarette/Vaping Substances     Social History     Tobacco Use    Smoking status: Every Day     Current packs/day: 0.25     Types: Cigarettes    Smokeless tobacco: Never   Vaping Use    Vaping status: Never Used   Substance Use Topics    Alcohol use: Not Currently     Comment: hasnt had a drink for years    Drug use: Never       Review of Systems   Constitutional:  Negative for appetite change, chills, diaphoresis, fever and unexpected weight change.   HENT:  Negative for congestion and rhinorrhea.    Eyes:  Negative for photophobia and visual disturbance.   Respiratory:  Negative for cough, chest tightness and shortness of breath.    Cardiovascular:  Negative for chest pain, palpitations and leg swelling.   Gastrointestinal:  Negative for abdominal distention, abdominal pain, blood in stool, constipation, diarrhea, nausea and vomiting.   Genitourinary:  Positive for flank pain. Negative for dysuria and hematuria.   Musculoskeletal:  Negative for back pain, joint swelling, neck pain and neck stiffness.   Skin:  Negative for color change, pallor, rash and wound.   Neurological:  Negative for dizziness, syncope, weakness, light-headedness and headaches.   Psychiatric/Behavioral:  Negative for agitation.    All other systems reviewed and are negative.      Physical Exam  Physical Exam  Vitals and nursing  note reviewed.   Constitutional:       General: She is not in acute distress.     Appearance: Normal appearance. She is well-developed. She is not ill-appearing, toxic-appearing or diaphoretic.   HENT:      Head: Normocephalic and atraumatic.      Nose: Nose normal. No congestion or rhinorrhea.      Mouth/Throat:      Mouth: Mucous membranes are moist.      Pharynx: Oropharynx is clear. No oropharyngeal exudate or posterior oropharyngeal erythema.   Eyes:      General: No scleral icterus.        Right eye: No discharge.         Left eye: No discharge.      Extraocular Movements: Extraocular movements intact.      Conjunctiva/sclera: Conjunctivae normal.      Pupils: Pupils are equal, round, and reactive to light.   Neck:      Vascular: No JVD.      Trachea: No tracheal deviation.      Comments: Supple. Normal range of motion.   Cardiovascular:      Rate and Rhythm: Normal rate and regular rhythm.      Heart sounds: Normal heart sounds. No murmur heard.     No friction rub. No gallop.      Comments: Normal rate and regular rhythm  Pulmonary:      Effort: Pulmonary effort is normal. No respiratory distress.      Breath sounds: Normal breath sounds. No stridor. No wheezing or rales.      Comments: Clear to auscultation bilaterally  Chest:      Chest wall: No tenderness.   Abdominal:      General: Bowel sounds are normal. There is no distension.      Palpations: Abdomen is soft.      Tenderness: There is no abdominal tenderness. There is no right CVA tenderness, left CVA tenderness, guarding or rebound.      Comments: Soft, nontender, nondistended.  Normal bowel sounds throughout   Musculoskeletal:         General: No swelling, tenderness, deformity or signs of injury. Normal range of motion.      Cervical back: Normal range of motion and neck supple. No rigidity. No muscular tenderness.      Right lower leg: No edema.      Left lower leg: No edema.   Lymphadenopathy:      Cervical: No cervical adenopathy.   Skin:      General: Skin is warm and dry.      Coloration: Skin is not pale.      Findings: No erythema or rash.   Neurological:      General: No focal deficit present.      Mental Status: She is alert. Mental status is at baseline.      Sensory: No sensory deficit.      Motor: No weakness or abnormal muscle tone.      Coordination: Coordination normal.      Gait: Gait normal.      Comments: Alert.  Strength and sensation grossly intact.  Ambulatory without difficulty at baseline.    Psychiatric:         Behavior: Behavior normal.         Thought Content: Thought content normal.         Vital Signs  ED Triage Vitals   Temperature Pulse Respirations Blood Pressure SpO2   07/22/24 1134 07/22/24 1134 07/22/24 1134 07/22/24 1134 07/22/24 1134   97.8 °F (36.6 °C) 79 20 141/85 98 %      Temp Source Heart Rate Source Patient Position - Orthostatic VS BP Location FiO2 (%)   07/22/24 1134 07/22/24 1134 07/22/24 1134 07/22/24 1134 --   Temporal Monitor Sitting Left arm       Pain Score       07/22/24 1226       10 - Worst Possible Pain           Vitals:    07/22/24 1134 07/22/24 1330   BP: 141/85 (!) 152/104   Pulse: 79 68   Patient Position - Orthostatic VS: Sitting Lying         Visual Acuity      ED Medications  Medications   ketorolac (TORADOL) injection 15 mg (15 mg Intravenous Given 7/22/24 1241)   acetaminophen (TYLENOL) tablet 975 mg (975 mg Oral Given 7/22/24 1226)   sodium chloride 0.9 % bolus 1,000 mL (0 mL Intravenous Stopped 7/22/24 1343)       Diagnostic Studies  Results Reviewed       Procedure Component Value Units Date/Time    Manual Differential(PHLEBS Do Not Order) [[id number]] Collected: 07/22/24 1239    Lab Status: Final result Specimen: Blood from Arm, Left Updated: 07/22/24 1429     Segmented % 74 %      Lymphocytes % 20 %      Monocytes % 4 %      Eosinophils % 1 %      Basophils % 1 %      Absolute Neutrophils 7.24 Thousand/uL      Absolute Lymphocytes 1.96 Thousand/uL      Absolute Monocytes 0.39 Thousand/uL       Absolute Eosinophils 0.10 Thousand/uL      Absolute Basophils 0.10 Thousand/uL      Total Counted --     Platelet Estimate Adequate     Giant PLTs Present     Large Platelet Present    CBC and differential [[id number]]  (Normal) Collected: 07/22/24 1239    Lab Status: Final result Specimen: Blood from Arm, Left Updated: 07/22/24 1347     WBC 9.79 Thousand/uL      RBC 5.08 Million/uL      Hemoglobin 14.0 g/dL      Hematocrit 44.0 %      MCV 87 fL      MCH 27.6 pg      MCHC 31.8 g/dL      RDW 14.0 %      MPV 10.9 fL      Platelets 266 Thousands/uL     Comprehensive metabolic panel [[id number]]  (Abnormal) Collected: 07/22/24 1239    Lab Status: Final result Specimen: Blood from Arm, Left Updated: 07/22/24 1325     Sodium 140 mmol/L      Potassium 4.5 mmol/L      Chloride 107 mmol/L      CO2 30 mmol/L      ANION GAP 3 mmol/L      BUN 7 mg/dL      Creatinine 0.59 mg/dL      Glucose 93 mg/dL      Calcium 9.1 mg/dL      AST 15 U/L      ALT 16 U/L      Alkaline Phosphatase 71 U/L      Total Protein 6.7 g/dL      Albumin 3.8 g/dL      Total Bilirubin 0.41 mg/dL      eGFR 95 ml/min/1.73sq m     Narrative:      [employer] guidelines for Chronic Kidney Disease (CKD):     Stage 1 with normal or high GFR (GFR > 90 mL/min/1.73 square meters)    Stage 2 Mild CKD (GFR = 60-89 mL/min/1.73 square meters)    Stage 3A Moderate CKD (GFR = 45-59 mL/min/1.73 square meters)    Stage 3B Moderate CKD (GFR = 30-44 mL/min/1.73 square meters)    Stage 4 Severe CKD (GFR = 15-29 mL/min/1.73 square meters)    Stage 5 End Stage CKD (GFR <15 mL/min/1.73 square meters)  Note: GFR calculation is accurate only with a steady state creatinine    Lipase [[id number]]  (Abnormal) Collected: 07/22/24 1239    Lab Status: Final result Specimen: Blood from Arm, Left Updated: 07/22/24 1325     Lipase <6 u/L     UA w Reflex to Microscopic w Reflex to Culture [[id number]] Collected: 07/22/24 1205    Lab Status: Final result Specimen: Urine,  Clean Catch Updated: 07/22/24 1250     Color, UA Light Yellow     Clarity, UA Clear     Specific Gravity, UA 1.018     pH, UA 6.5     Leukocytes, UA Negative     Nitrite, UA Negative     Protein, UA Negative mg/dl      Glucose, UA Negative mg/dl      Ketones, UA Negative mg/dl      Urobilinogen, UA <2.0 mg/dl      Bilirubin, UA Negative     Occult Blood, UA Negative                   CT renal stone study abdomen pelvis wo contrast   Final Result by Elian Weber MD (07/22 1307)      There is a 1.6 cm linear density within the distal small bowel in the pelvis. There is no evidence of a bowel obstruction. Clinical correlation if the patient may have ingested a small bone or foreign body.      The study was marked in EPIC for immediate notification.      Workstation performed: [id number]                    Procedures  Procedures         ED Course                                 SBIRT 22yo+      Flowsheet Row Most Recent Value   Initial Alcohol Screen: US AUDIT-C     1. How often do you have a drink containing alcohol? 0 Filed at: 07/22/2024 1344   2. How many drinks containing alcohol do you have on a typical day you are drinking?  0 Filed at: 07/22/2024 1344   3b. FEMALE Any Age, or MALE 65+: How often do you have 4 or more drinks on one occassion? 0 Filed at: 07/22/2024 1344   Audit-C Score 0 Filed at: 07/22/2024 1344   JULIANA: How many times in the past year have you...    Used an illegal drug or used a prescription medication for non-medical reasons? Never Filed at: 07/22/2024 1344                      Medical Decision Making  64-year-old female no significant reported past history presenting with left flank pain.  Benign abdominal exam.  Plan for CT imaging and labs including abdominal labs.  UA.  Symptom management with oral and IV pain medication plus fluids.  Reassess.    Labs interpreted by me without significant acute process.  Symptoms improving with medications.  CT notable for concern for foreign body  distal small bowel.  Patient reports recent ingestion of fish and chicken.  Possible bone.  Discussed bowel regimen to help with passage of foreign body.  Advised to follow-up in a few weeks for imaging to ensure passage. Discussed results and recommendations. Advised follow up PCP and GI. Medication recommendations. Given instructions and return precautions. Patient/family at bedside acknowledged understanding of all written and verbal instructions and return precautions. Discharged.     Amount and/or Complexity of Data Reviewed  Labs: ordered.  Radiology: ordered.    Risk  OTC drugs.  Prescription drug management.                 Disposition  Final diagnoses:   Left flank pain   Foreign body in small intestine, initial encounter     Time reflects when diagnosis was documented in both MDM as applicable and the Disposition within this note       Time User Action Codes Description Comment    7/22/2024 12:34 PM Murali Kinney Add [R10.9] Left flank pain     7/22/2024  1:31 PM Murali Kinney Add [T18.3XXA] Foreign body in small intestine, initial encounter           ED Disposition       ED Disposition   Discharge    Condition   Stable    Date/Time   Mon Jul 22, 2024 1331    Comment   Reina Cano discharge to home/self care.                   Follow-up Information       Follow up With Specialties Details Why Contact Info    Moose Perera,  Family Medicine Schedule an appointment as soon as possible for a visit in 1 week  [address] [facility] [address]  [phone]              Discharge Medication List as of 7/22/2024  1:32 PM        CONTINUE these medications which have NOT CHANGED    Details   atorvastatin (LIPITOR) 80 mg tablet TAKE 1 TABLET BY MOUTIN THE MORNING, Historical Med      betamethasone, augmented, (DIPROLENE-AF) 0.05 % cream Apply topically 2 (two) times a day, Starting Fri 9/15/2023, Historical Med      bismuth subsalicylate (PEPTO BISMOL) 262 MG chewable tablet Chew 2 tablets (524 mg total) 4  (four) times a day (before meals and at bedtime), Starting Tue 12/26/2023, Normal      clotrimazole-betamethasone (LOTRISONE) 1-0.05 % cream Apply topically 2 (two) times a day, Starting Tue 7/18/2023, Until Fri 6/21/2024, Historical Med      cyclobenzaprine (FLEXERIL) 10 mg tablet Take 10 mg by mouth, Starting Fri 9/15/2023, Historical Med      gabapentin (NEURONTIN) 100 mg capsule Historical Med      ibuprofen (MOTRIN) 400 mg tablet Take by mouth every 6 (six) hours as needed for mild pain, Historical Med      naproxen (NAPROSYN) 500 mg tablet Take 500 mg by mouth, Starting Mon 4/10/2023, Until Tue 4/9/2024 at 2359, Historical Med      omeprazole (PriLOSEC) 40 MG capsule Take 1 capsule (40 mg total) by mouth 2 (two) times a day for 14 days 1/2 hr. Ac meals in AM and PM, Starting Tue 12/26/2023, Until Fri 6/21/2024, Normal      pantoprazole (PROTONIX) 40 mg tablet TAKE 1 TABLET BY MOUTH DAILY FOR STOMACH PAIN, Historical Med             No discharge procedures on file.    PDMP Review       None            ED Provider  Electronically Signed by             Murali Kinney MD  07/22/24 [id number]